# Patient Record
Sex: FEMALE | Race: WHITE | NOT HISPANIC OR LATINO | Employment: FULL TIME | URBAN - METROPOLITAN AREA
[De-identification: names, ages, dates, MRNs, and addresses within clinical notes are randomized per-mention and may not be internally consistent; named-entity substitution may affect disease eponyms.]

---

## 2018-04-02 ENCOUNTER — APPOINTMENT (EMERGENCY)
Dept: RADIOLOGY | Facility: HOSPITAL | Age: 38
End: 2018-04-02
Payer: COMMERCIAL

## 2018-04-02 ENCOUNTER — HOSPITAL ENCOUNTER (EMERGENCY)
Facility: HOSPITAL | Age: 38
Discharge: HOME | End: 2018-04-02
Attending: EMERGENCY MEDICINE | Admitting: EMERGENCY MEDICINE
Payer: COMMERCIAL

## 2018-04-02 VITALS
OXYGEN SATURATION: 98 % | RESPIRATION RATE: 20 BRPM | HEART RATE: 80 BPM | HEIGHT: 67 IN | DIASTOLIC BLOOD PRESSURE: 79 MMHG | BODY MASS INDEX: 37.67 KG/M2 | SYSTOLIC BLOOD PRESSURE: 132 MMHG | TEMPERATURE: 97 F | WEIGHT: 240 LBS

## 2018-04-02 DIAGNOSIS — M79.604 MUSCULOSKELETAL PAIN OF RIGHT LOWER EXTREMITY: Primary | ICD-10-CM

## 2018-04-02 PROCEDURE — 73552 X-RAY EXAM OF FEMUR 2/>: CPT

## 2018-04-02 PROCEDURE — 96372 THER/PROPH/DIAG INJ SC/IM: CPT

## 2018-04-02 PROCEDURE — 99283 EMERGENCY DEPT VISIT LOW MDM: CPT

## 2018-04-02 PROCEDURE — 72170 X-RAY EXAM OF PELVIS: CPT

## 2018-04-02 RX ORDER — KETOROLAC TROMETHAMINE 30 MG/ML
15 INJECTION, SOLUTION INTRAMUSCULAR; INTRAVENOUS ONCE
Status: COMPLETED | OUTPATIENT
Start: 2018-04-02 | End: 2018-04-02

## 2018-04-02 RX ADMIN — KETOROLAC TROMETHAMINE 15 MG: 30 INJECTION, SOLUTION INTRAMUSCULAR; INTRAVENOUS at 19:23

## 2018-04-03 NOTE — ED PROVIDER NOTES
History  Chief Complaint   Patient presents with    Hip Pain     To ED with c/o right hip pain for 2 months  Patient denies any injury, falls,  States that she is unable to pick her leg above 3 inches off the ground  History provided by:  Patient   used: No      Pt is a 43 y/o female presentign to ED with right hip pain  2 months but gettign worse, worse with ambulation  No falls or trauma  No f/c  No n/v  No abd pain  Did not lose bowel or bladder control  No numbness or tingling  Able to ambulate     mdm xray, nsaid for pain  Pt does not want crutches  To follow up with ortho        None       History reviewed  No pertinent past medical history  History reviewed  No pertinent surgical history  History reviewed  No pertinent family history  I have reviewed and agree with the history as documented  Social History   Substance Use Topics    Smoking status: Current Every Day Smoker    Smokeless tobacco: Never Used    Alcohol use Yes      Comment: social        Review of Systems   Constitutional: Negative for chills, diaphoresis and fever  HENT: Negative for congestion and sore throat  Respiratory: Negative for cough, shortness of breath, wheezing and stridor  Cardiovascular: Negative for chest pain, palpitations and leg swelling  Gastrointestinal: Negative for abdominal pain, blood in stool, diarrhea, nausea and vomiting  Genitourinary: Negative for dysuria, frequency and urgency  Musculoskeletal: Negative for back pain, neck pain and neck stiffness  Skin: Negative for pallor and rash  Neurological: Negative for dizziness, syncope, weakness, light-headedness and headaches  All other systems reviewed and are negative        Physical Exam  ED Triage Vitals [04/02/18 1849]   Temperature Pulse Respirations Blood Pressure SpO2   (!) 97 °F (36 1 °C) 80 20 132/79 98 %      Temp Source Heart Rate Source Patient Position - Orthostatic VS BP Location FiO2 (%) Tympanic Monitor Sitting Right arm --      Pain Score       7           Orthostatic Vital Signs  Vitals:    04/02/18 1849   BP: 132/79   Pulse: 80   Patient Position - Orthostatic VS: Sitting       Physical Exam   Constitutional: She is oriented to person, place, and time  She appears well-developed and well-nourished  HENT:   Head: Normocephalic and atraumatic  Eyes: Pupils are equal, round, and reactive to light  Neck: Neck supple  Cardiovascular: Normal rate, regular rhythm, normal heart sounds and intact distal pulses  Pulmonary/Chest: Effort normal and breath sounds normal  No respiratory distress  Abdominal: Soft  Bowel sounds are normal  There is no tenderness  Musculoskeletal: She exhibits no edema or deformity  Decreased ROM of right hip mehreen to pain, neurovascaulrly intact distally, no bruising, no swelling, no skin changes  No c/t/l spine tenderness   Neurological: She is alert and oriented to person, place, and time  Skin: Skin is warm and dry  Capillary refill takes less than 2 seconds  No rash noted  No erythema  Vitals reviewed  ED Medications  Medications   ketorolac (TORADOL) injection 15 mg (15 mg Intramuscular Given 4/2/18 1923)       Diagnostic Studies  Results Reviewed     None                 XR pelvis ap only 1 or 2 vw   Final Result by Leo Monroe MD (04/02 2135)      No acute fracture    Mild lumbar spondylosis  Workstation performed: [id number]         XR femur 2 views RIGHT   Final Result by Leo Monroe MD (04/02 2133)      No acute osseous abnormality              Workstation performed: [id number]                    Procedures  Procedures       Phone Contacts  ED Phone Contact    ED Course  ED Course                                MDM  CritCare Time    Disposition  Final diagnoses:   Musculoskeletal pain of right lower extremity     Time reflects when diagnosis was documented in both MDM as applicable and the Disposition within this note Time User Action Codes Description Comment    4/2/2018  8:01 PM Tim Fix Add [Y20 330] Musculoskeletal pain of right lower extremity       ED Disposition     ED Disposition Condition Comment    Discharge  Nasrin Living discharge to home/self care  Condition at discharge: Good        Follow-up Information     Follow up With Specialties Details Why Contact Info Additional Information    [address] [facility] Emergency Medicine  As needed, If symptoms worsen [address] [facility]  [address] [facility] [address] [place] [address], [address], [facility], [address] [facility], [address] [facility] Orthopedic Surgery Call in 1 day  make an appointment for hip pain [address] [facility]  [address] [phone]         There are no discharge medications for this patient  No discharge procedures on file      ED Provider  Electronically Signed by           Socorro Hayward MD  04/03/18 1552

## 2018-04-03 NOTE — DISCHARGE INSTRUCTIONS
Please use ibuprofen and Tylenol for pain as needed   Every 6 hours  Leg Cramps   WHAT YOU NEED TO KNOW:   A leg cramp is a sudden, painful squeeze in your calf (lower leg) or thigh (upper leg) muscles  The muscle may twitch under the skin or feel hard  Your leg may feel sore long after the muscles relax  DISCHARGE INSTRUCTIONS:   To stretch your leg:  Warm up your muscles before you stretch  Take a short walk or run slowly in place  If you get leg cramps while you sleep, you may also want to stretch before bedtime  The following exercises stretch the calves and thighs to help stop or prevent leg cramps:  · To stretch your calf and heel:      ¨ Stand up and place the palms of your hands against a wall  ¨ Lean into the wall, with one leg behind the other  Bend the front leg and keep the back leg straight  ¨ Press the heel of your back leg into the floor  ¨ Hold for 15 to 30 seconds, then switch sides  · To stretch the back of your knee and thigh:      ¨ Sit on the floor with your legs straight in front of you  Do not point your toes  ¨ Place the palms of your hands at your sides on the floor  Slide your hands past your hips toward your ankles  ¨ Move toward your ankles until you feel a stretch in the back of your legs  Do not try to touch your head to your knees or round your back  ¨ Hold for 30 seconds  Drink plenty of liquids during exercise:  Water and other liquids help prevent cramps by replacing fluids lost in sweat  Drink more liquids when you are active in hot weather  To stop a leg cramp if it happens again:   · Stretch and massage your muscle to stop the cramp  · Apply heat or ice packs to the cramp  A heating pad or hot pack may help relieve tight muscles  An ice pack or crushed ice in a bag, wrapped in a towel can soothe tender or sore muscles    Take your medicine as directed:  Call your healthcare provider if you think your medicine is not helping or if you have side effects  Tell him if you are taking any vitamins, herbs, or other medicines  Keep a list of the medicines you take  Include the amounts, and when and why you take them  Bring the list or the pill bottles to follow-up visits  Follow up with your healthcare provider as directed:  Write down any questions you have so you remember to ask them in your follow-up visits  Contact your healthcare provider if:   · Your leg cramps get worse or happen more often  · Your leg feels numb  · Your leg cramps do not go away with stretching or massage  · You feel dizzy, lightheaded, or confused when you exercise in hot weather  You may have a headache or blurred vision  © 2017 [facility] Information is for End User's use only and may not be sold, redistributed or otherwise used for commercial purposes  All illustrations and images included in CareNotes® are the copyrighted property of [employer]  or Darius Moran  The above information is an  only  It is not intended as medical advice for individual conditions or treatments  Talk to your doctor, nurse or pharmacist before following any medical regimen to see if it is safe and effective for you

## 2019-04-10 ENCOUNTER — OFFICE VISIT (OUTPATIENT)
Dept: URGENT CARE | Facility: CLINIC | Age: 39
End: 2019-04-10
Payer: COMMERCIAL

## 2019-04-10 VITALS
HEART RATE: 92 BPM | BODY MASS INDEX: 39.16 KG/M2 | SYSTOLIC BLOOD PRESSURE: 120 MMHG | DIASTOLIC BLOOD PRESSURE: 78 MMHG | WEIGHT: 250 LBS | OXYGEN SATURATION: 99 %

## 2019-04-10 DIAGNOSIS — H10.31 ACUTE BACTERIAL CONJUNCTIVITIS OF RIGHT EYE: Primary | ICD-10-CM

## 2019-04-10 PROCEDURE — 99203 OFFICE O/P NEW LOW 30 MIN: CPT | Performed by: PHYSICIAN ASSISTANT

## 2019-04-10 RX ORDER — POLYMYXIN B SULFATE AND TRIMETHOPRIM 1; 10000 MG/ML; [USP'U]/ML
1 SOLUTION OPHTHALMIC EVERY 4 HOURS
Qty: 10 ML | Refills: 0 | Status: SHIPPED | OUTPATIENT
Start: 2019-04-10 | End: 2019-04-17

## 2019-07-28 ENCOUNTER — HOSPITAL ENCOUNTER (EMERGENCY)
Facility: HOSPITAL | Age: 39
Discharge: HOME | End: 2019-07-28
Attending: EMERGENCY MEDICINE
Payer: COMMERCIAL

## 2019-07-28 VITALS
SYSTOLIC BLOOD PRESSURE: 110 MMHG | RESPIRATION RATE: 15 BRPM | HEART RATE: 80 BPM | WEIGHT: 255 LBS | DIASTOLIC BLOOD PRESSURE: 62 MMHG | TEMPERATURE: 97.3 F | BODY MASS INDEX: 39.94 KG/M2 | OXYGEN SATURATION: 97 %

## 2019-07-28 DIAGNOSIS — H72.92 TYMPANIC MEMBRANE PERFORATION, LEFT: ICD-10-CM

## 2019-07-28 DIAGNOSIS — H60.92 LEFT OTITIS EXTERNA: Primary | ICD-10-CM

## 2019-07-28 PROCEDURE — 99283 EMERGENCY DEPT VISIT LOW MDM: CPT | Performed by: EMERGENCY MEDICINE

## 2019-07-28 PROCEDURE — 99282 EMERGENCY DEPT VISIT SF MDM: CPT

## 2019-07-28 RX ORDER — AMOXICILLIN 250 MG/1
500 CAPSULE ORAL ONCE
Status: COMPLETED | OUTPATIENT
Start: 2019-07-28 | End: 2019-07-28

## 2019-07-28 RX ORDER — HYDROCODONE BITARTRATE AND ACETAMINOPHEN 5; 325 MG/1; MG/1
1 TABLET ORAL EVERY 6 HOURS PRN
Qty: 12 TABLET | Refills: 0 | Status: SHIPPED | OUTPATIENT
Start: 2019-07-28

## 2019-07-28 RX ORDER — AMOXICILLIN 875 MG/1
875 TABLET, COATED ORAL 2 TIMES DAILY
Qty: 19 TABLET | Refills: 0 | Status: SHIPPED | OUTPATIENT
Start: 2019-07-28 | End: 2019-08-07

## 2019-07-28 RX ADMIN — AMOXICILLIN 500 MG: 250 CAPSULE ORAL at 22:51

## 2019-07-29 NOTE — ED PROVIDER NOTES
History  Chief Complaint   Patient presents with    Earache     Pt states that she started with ear pain on Friday  Pain 8/10  Sharp shooting pains  Denies drainage  C/o headache      This 66-year-old female complains of left ear pain for the past 3 or 4 days  It is unrelieved with ibuprofen and Tylenol  She has a mild headache  She denies pain in her neck, throat and nose  No change in vision, hearing or breathing  Denies vertigo, diplopia, neck pain, focal neurologic changes  States she has a remote tympanic membrane rupture on the left side for which she received no treatment  None       History reviewed  No pertinent past medical history  Past Surgical History:   Procedure Laterality Date    APPENDECTOMY       SECTION  ,     HERNIA REPAIR      TUBAL LIGATION         History reviewed  No pertinent family history  I have reviewed and agree with the history as documented  Social History     Tobacco Use    Smoking status: Current Every Day Smoker    Smokeless tobacco: Never Used   Substance Use Topics    Alcohol use: Yes     Comment: social    Drug use: No        Review of Systems   Constitutional: Negative  HENT: Positive for ear pain  Negative for ear discharge, facial swelling, hearing loss, postnasal drip, rhinorrhea, sinus pain, sore throat, tinnitus and trouble swallowing  Eyes: Negative  Respiratory: Negative  Cardiovascular: Negative  Gastrointestinal: Negative  Endocrine: Negative  Genitourinary: Negative  Musculoskeletal: Negative  Skin: Negative  Allergic/Immunologic: Negative  Neurological: Negative  Hematological: Negative  Psychiatric/Behavioral: Negative  All other systems reviewed and are negative  Physical Exam  Physical Exam   Constitutional: She is oriented to person, place, and time  She appears well-developed and well-nourished  HENT:   Head: Normocephalic and atraumatic     Nose: Nose normal  Mouth/Throat: Oropharynx is clear and moist    Right TM mildly injected  No fluid or deformity  Left TM is injected and has a perforation  There appears to be blood within the TM  There is no drainage from the TM  There appears to be small abscess or collection of blood in the external auditory canal   No swelling of the canal   No pain with movement of the pinna  Eyes: Pupils are equal, round, and reactive to light  Conjunctivae and EOM are normal    Neck: Normal range of motion  Neck supple  No JVD present  Cardiovascular: Normal rate and regular rhythm  No murmur heard  Pulmonary/Chest: Effort normal and breath sounds normal    Abdominal: Soft  Bowel sounds are normal  There is no tenderness  Musculoskeletal: Normal range of motion  She exhibits no edema  Lymphadenopathy:     She has no cervical adenopathy  Neurological: She is alert and oriented to person, place, and time  She has normal reflexes  No cranial nerve deficit  Coordination normal    Skin: Skin is warm and dry  Capillary refill takes less than 2 seconds  No rash noted  Psychiatric: She has a normal mood and affect  Nursing note and vitals reviewed        Vital Signs  ED Triage Vitals   Temperature Pulse Respirations Blood Pressure SpO2   07/28/19 2232 07/28/19 2232 07/28/19 2232 07/28/19 2232 07/28/19 2232   (!) 97 3 °F (36 3 °C) 88 18 128/65 100 %      Temp Source Heart Rate Source Patient Position - Orthostatic VS BP Location FiO2 (%)   07/28/19 2232 07/28/19 2245 07/28/19 2232 07/28/19 2232 --   Temporal Monitor Lying Left arm       Pain Score       --                  Vitals:    07/28/19 2232 07/28/19 2245   BP: 128/65 110/62   Pulse: 88 80   Patient Position - Orthostatic VS: Lying Lying         Visual Acuity      ED Medications  Medications   amoxicillin (AMOXIL) capsule 500 mg (500 mg Oral Given 7/28/19 2251)       Diagnostic Studies  Results Reviewed     None                 No orders to display Procedures  Procedures       ED Course                               [facility]  Number of Diagnoses or Management Options  Left otitis externa: new and does not require workup  Tympanic membrane perforation, left:   Diagnosis management comments: Patient with history of prior TM perforation  Will treat with oral antibiotic get a order for ENT to see the auditory canal and TM clearly in close follow-up  Disposition  Final diagnoses:   Left otitis externa   Tympanic membrane perforation, left     Time reflects when diagnosis was documented in both MDM as applicable and the Disposition within this note     Time User Action Codes Description Comment    7/28/2019 10:45 PM Marley Cueva Add [H60 92] Left otitis externa     7/28/2019 10:45 PM Marley Cueva Add [H72 92] Tympanic membrane perforation, left       ED Disposition     ED Disposition Condition Date/Time Comment    Discharge Stable Sun Jul 28, 2019 10:45 PM Bebeto Bean discharge to home/self care  Follow-up Information     Follow up With Specialties Details Why Pau Michael MD Otolaryngology Call in 1 day Follow-up on this infection and perforated eardrum [address] [facility]  [address] [facility]            Discharge Medication List as of 7/28/2019 10:51 PM      START taking these medications    Details   amoxicillin (AMOXIL) 875 mg tablet Take 1 tablet (875 mg total) by mouth 2 (two) times a day for 10 days, Starting Sun 7/28/2019, Until Wed 8/7/2019, Normal      HYDROcodone-acetaminophen (NORCO) 5-325 mg per tablet Take 1 tablet by mouth every 6 (six) hours as needed for pain for up to 12 dosesMax Daily Amount: 4 tablets, Starting Sun 7/28/2019, Normal           No discharge procedures on file      ED Provider  Electronically Signed by           Rochelle Pope DO  07/31/19 3767

## 2019-07-29 NOTE — DISCHARGE INSTRUCTIONS
Instead of ear drops for otitis externa I want you to take oral antibiotics so the doctor can have a good view of the canal and the ear drum  Take ibuprofen and Tylenol as needed for pain  Reserve narcotic for severe pain  Take antibiotic as directed

## 2020-01-22 ENCOUNTER — OFFICE VISIT (OUTPATIENT)
Dept: URGENT CARE | Facility: CLINIC | Age: 40
End: 2020-01-22
Payer: COMMERCIAL

## 2020-01-22 ENCOUNTER — APPOINTMENT (OUTPATIENT)
Dept: RADIOLOGY | Facility: CLINIC | Age: 40
End: 2020-01-22
Payer: COMMERCIAL

## 2020-01-22 VITALS
SYSTOLIC BLOOD PRESSURE: 134 MMHG | TEMPERATURE: 98 F | OXYGEN SATURATION: 98 % | WEIGHT: 250 LBS | BODY MASS INDEX: 39.24 KG/M2 | RESPIRATION RATE: 21 BRPM | DIASTOLIC BLOOD PRESSURE: 73 MMHG | HEART RATE: 97 BPM | HEIGHT: 67 IN

## 2020-01-22 DIAGNOSIS — G89.29 CHRONIC HEEL PAIN, RIGHT: ICD-10-CM

## 2020-01-22 DIAGNOSIS — M79.671 CHRONIC HEEL PAIN, RIGHT: Primary | ICD-10-CM

## 2020-01-22 DIAGNOSIS — G89.29 CHRONIC HEEL PAIN, RIGHT: Primary | ICD-10-CM

## 2020-01-22 DIAGNOSIS — M72.2 PLANTAR FASCIITIS OF RIGHT FOOT: ICD-10-CM

## 2020-01-22 DIAGNOSIS — M79.671 CHRONIC HEEL PAIN, RIGHT: ICD-10-CM

## 2020-01-22 PROCEDURE — 99213 OFFICE O/P EST LOW 20 MIN: CPT | Performed by: NURSE PRACTITIONER

## 2020-01-22 PROCEDURE — 73650 X-RAY EXAM OF HEEL: CPT

## 2020-01-22 RX ORDER — PREDNISONE 20 MG/1
TABLET ORAL
Qty: 18 TABLET | Refills: 0 | Status: SHIPPED | OUTPATIENT
Start: 2020-01-22

## 2020-01-22 NOTE — PROGRESS NOTES
[facility]        NAME: Negrito Dickens is a 37 y o  female  : 1980    MRN: [id number]  DATE: 2020  TIME: 2:29 PM    Assessment and Plan   Chronic heel pain, right [M79 671, G64 29]  1  Chronic heel pain, right  XR heel / calcaneus 2+ vw right   2  Plantar fasciitis of right foot  predniSONE 20 mg tablet         Patient Instructions     Establish with PCP; may need referral to podiatrist or orthopedic  Take meds as directed  Rest, ice , heat  Proceed to  ER if symptoms worsen  Chief Complaint     Chief Complaint   Patient presents with    Foot Pain     Right foot, heel pain  Sharp shooting pain from the heel to the back of the leg; Symptoms began approximatrly a year ago and became more of an issue when she couldn't bare weight a week ago  Pt states she does not have a PCP  History of Present Illness       HPI   Reports pain to the right heel x 1 year  Has gotten worse over the past 2 weeks  Was previously having symptoms every day, mild x 1 year  Shooting pain  Today pain is 9/10  Constant  Radiates up the back of the heel to the back of the calf  Worse with weight bearing  Tried ibuprofen, warm soaks, and stretching  Not helping much  Review of Systems   Review of Systems   Musculoskeletal: Positive for gait problem and myalgias (right heel)  Negative for joint swelling  Skin: Negative for wound  Neurological: Negative for weakness and numbness           Current Medications       Current Outpatient Medications:     HYDROcodone-acetaminophen (NORCO) 5-325 mg per tablet, Take 1 tablet by mouth every 6 (six) hours as needed for pain for up to 12 dosesMax Daily Amount: 4 tablets (Patient not taking: Reported on 2020), Disp: 12 tablet, Rfl: 0    predniSONE 20 mg tablet, 3 tabs/day x 3 days, then BID x 3 days, then daily x 3 days, Disp: 18 tablet, Rfl: 0    Current Allergies     Allergies as of 2020    (No Known Allergies)            The following portions of the patient's history were reviewed and updated as appropriate: allergies, current medications, past family history, past medical history, past social history, past surgical history and problem list      History reviewed  No pertinent past medical history  Past Surgical History:   Procedure Laterality Date    APPENDECTOMY       SECTION  ,     HERNIA REPAIR      TUBAL LIGATION         History reviewed  No pertinent family history  Medications have been verified  Objective   /73   Pulse 97   Temp 98 °F (36 7 °C) (Tympanic)   Resp 21   Ht 5' 7" (1 702 m)   Wt 113 kg (250 lb)   SpO2 98%   BMI 39 16 kg/m²        Physical Exam     Physical Exam   Musculoskeletal: Normal range of motion  She exhibits edema (of the heel, mild) and tenderness  Skin: No rash noted  No erythema     No skin break

## 2020-01-22 NOTE — PATIENT INSTRUCTIONS
Plantar Fasciitis   WHAT YOU NEED TO KNOW:   Rest, ice, and foot supports can help your plantar fascia heal  You may need medicines to decrease swelling and pain  A physical therapist can teach you exercises to help improve movement and strength, and to decrease pain  DISCHARGE INSTRUCTIONS:   Contact your healthcare provider or podiatrist if:   · Your pain and swelling increase  · You develop knee, hip, or back pain  · You have questions or concerns about your condition or care  Medicines: You may  need any of the following:  · Acetaminophen  decreases pain and fever  It is available without a doctor's order  Ask how much to give your child and how often to give it  Follow directions  Read the labels of all other medicines your child uses to see if they also contain acetaminophen, or ask your child's doctor or pharmacist  Acetaminophen can cause liver damage if not taken correctly  · NSAIDs , such as ibuprofen, help decrease swelling, pain, and fever  NSAIDs can cause stomach bleeding or kidney problems in certain people  If you take blood thinner medicine, always ask your healthcare provider if NSAIDs are safe for you  Always read the medicine label and follow directions  · Take your medicine as directed  Contact your healthcare provider if you think your medicine is not helping or if you have side effects  Tell him of her if you are allergic to any medicine  Keep a list of the medicines, vitamins, and herbs you take  Include the amounts, and when and why you take them  Bring the list or the pill bottles to follow-up visits  Carry your medicine list with you in case of an emergency  Self-care:   · Wear your splint or shoe inserts as directed  You may need to wear a splint at night to keep your foot stretched while you sleep  This will help prevent sharp pain first thing in the morning  Shoe inserts will help decrease stress on your plantar fascia when you walk or exercise       · Rest as directed  Rest as much as possible to decrease swelling and prevent more damage  Ask your healthcare provider when you can return to your normal activities  · Apply ice on your plantar fascia  Ice helps prevent tissue damage and decreases swelling and pain  Fill a water bottle with water and freeze it  Wrap a towel around the bottle or cover it with a pillow case  Roll the water bottle under your foot for 10 minutes in the morning and after work  · Massage your plantar fascia as directed  This may help decrease swelling and pain  Roll a golf ball under your foot for 10 minutes  Repeat 3 times each day  · Go to physical therapy as directed  A physical therapist teaches you exercises to help improve movement and strength, and to decrease pain  Prevent plantar fasciitis:   · Maintain a healthy weight  This will help decrease stress on your feet  Ask your healthcare provider how much you should weigh  Ask him to help you create a weight loss plan if you are overweight  · Do low-impact exercises  Low-impact exercises decrease stress on your plantar fascia  Examples include swimming or bicycling  · Start new activities slowly  Increase the intensity and time gradually  · Wear shoes that fit well and support your arch  Replace your shoes before the padding or shock absorption wears out  Do not walk or  bare feet or sandals for long periods of time  · Stretch before you exercise  Ask your healthcare provider how to stretch your plantar fascia and calf muscles  Follow up with your healthcare provider or podiatrist as directed:  Write down your questions so you remember to ask them during your visits  © 2017 [facility] Information is for End User's use only and may not be sold, redistributed or otherwise used for commercial purposes  All illustrations and images included in CareNotes® are the copyrighted property of [employer]  or [facility]    The above information is an  only  It is not intended as medical advice for individual conditions or treatments  Talk to your doctor, nurse or pharmacist before following any medical regimen to see if it is safe and effective for you

## 2020-01-27 ENCOUNTER — OFFICE VISIT (OUTPATIENT)
Dept: URGENT CARE | Facility: CLINIC | Age: 40
End: 2020-01-27
Payer: COMMERCIAL

## 2020-01-27 VITALS
SYSTOLIC BLOOD PRESSURE: 135 MMHG | BODY MASS INDEX: 39.24 KG/M2 | RESPIRATION RATE: 18 BRPM | OXYGEN SATURATION: 100 % | HEIGHT: 67 IN | WEIGHT: 250 LBS | HEART RATE: 81 BPM | DIASTOLIC BLOOD PRESSURE: 71 MMHG | TEMPERATURE: 98 F

## 2020-01-27 DIAGNOSIS — J02.9 SORE THROAT: Primary | ICD-10-CM

## 2020-01-27 LAB — S PYO AG THROAT QL: NEGATIVE

## 2020-01-27 PROCEDURE — 99213 OFFICE O/P EST LOW 20 MIN: CPT | Performed by: NURSE PRACTITIONER

## 2020-01-27 PROCEDURE — 87880 STREP A ASSAY W/OPTIC: CPT | Performed by: NURSE PRACTITIONER

## 2020-01-27 PROCEDURE — 87070 CULTURE OTHR SPECIMN AEROBIC: CPT | Performed by: NURSE PRACTITIONER

## 2020-01-27 NOTE — PROGRESS NOTES
[facility]        NAME: Felix Siu is a 37 y o  female  : 1980    MRN: [id number]  DATE: 2020  TIME: 6:42 PM    Assessment and Plan   Sore throat [J02 9]  1  Sore throat  POCT rapid strepA    Throat culture         Patient Instructions     Advised to continue hydration with water  Throat lozenges OTC  Rapid strep was negative; will send for culture  Will follow-up with PCP for back pain  Follow up with PCP in 3-5 days  Proceed to  ER if symptoms worsen  Chief Complaint     Chief Complaint   Patient presents with    Sore Throat     Began this morning   Back Pain         History of Present Illness       HPI   Patient presents to clinic with complaint of sore throat that started this morning  States her mouth feels dry and has been dry or through the day  That she has been drinking water, so the on the morning a without any improvement in the dryness  No other symptoms  Denies history of diabetes  Does not have a PCP  States a PCP checked her blood sugar several years ago and was normal   Refused for us to check the blood sugar, today  Review of Systems   Review of Systems   Constitutional: Negative for chills and fever  HENT: Positive for sore throat  Negative for congestion and trouble swallowing  Respiratory: Negative for cough, chest tightness and wheezing  Cardiovascular: Negative for chest pain  Gastrointestinal: Negative for diarrhea, nausea and vomiting  Neurological: Negative for dizziness and headaches           Current Medications       Current Outpatient Medications:     predniSONE 20 mg tablet, 3 tabs/day x 3 days, then BID x 3 days, then daily x 3 days, Disp: 18 tablet, Rfl: 0    HYDROcodone-acetaminophen (NORCO) 5-325 mg per tablet, Take 1 tablet by mouth every 6 (six) hours as needed for pain for up to 12 dosesMax Daily Amount: 4 tablets (Patient not taking: Reported on 2020), Disp: 12 tablet, Rfl: 0    Current Allergies Allergies as of 2020    (No Known Allergies)            The following portions of the patient's history were reviewed and updated as appropriate: allergies, current medications, past family history, past medical history, past social history, past surgical history and problem list      History reviewed  No pertinent past medical history  Past Surgical History:   Procedure Laterality Date    APPENDECTOMY       SECTION  ,     HERNIA REPAIR      TUBAL LIGATION         History reviewed  No pertinent family history  Medications have been verified  Objective   /71   Pulse 81   Temp 98 °F (36 7 °C) (Tympanic)   Resp 18   Ht 5' 7" (1 702 m)   Wt 113 kg (250 lb)   SpO2 100%   BMI 39 16 kg/m²        Physical Exam     Physical Exam   Constitutional: She appears well-developed  She does not appear ill  HENT:   Right Ear: Tympanic membrane normal  No middle ear effusion  Left Ear: Tympanic membrane normal   No middle ear effusion  Mouth/Throat: No posterior oropharyngeal edema or posterior oropharyngeal erythema  Tonsils are 0 on the right  Tonsils are 0 on the left  No tonsillar exudate  Cardiovascular: Normal rate and regular rhythm  Pulmonary/Chest: Effort normal and breath sounds normal    Lymphadenopathy:     She has no cervical adenopathy

## 2020-01-27 NOTE — PATIENT INSTRUCTIONS
Sore Throat, Ambulatory Care   GENERAL INFORMATION:   A sore throat  is often caused by a cold or flu virus  A sore throat may also be caused by bacteria such as strep  Other causes include smoking, a runny nose, allergies, or acid reflux  Seek immediate care for the following symptoms:   · Trouble breathing or swallowing because your throat is swollen or sore    · Drooling because it hurts too much to swallow    · A painful lump in your throat that does not go away after 5 days    · A fever higher than 102? F (39?C) or lasts longer than 3 days    · Confusion    · Blood in your throat or ear  Treatment for a sore throat  will depend on the cause how severe it is  A sore throat cause by a virus will go away on its own without treatment  You will need antibiotics if your sore throat is caused by bacteria  Your sore throat should start to feel better within 3 to 5 days for both viral and bacterial infections  Care for your sore throat:   · Gargle with salt water  Mix ¼ teaspoon salt in a glass of warm water and gargle  This may help reduce swelling in your throat  · Take ibuprofen or acetaminophen:  These medicines decrease pain and fever  They are available without a doctor's order  Ask your healthcare provider which medicine is best for you  Ask how much to take and how often to take it  · Drink more liquids  Cold or warm drinks may help soothe your sore throat  Drinking liquids can also help prevent dehydration  · Use a cool-steam humidifier  to help moisten the air in your room and reduce your throat pain  · Use lozenges, ice, soft foods, or popsicles  to soothe your throat  · Rest your throat as much as possible  Try not to use your voice  This may irritate your throat and worsen your symptoms  Follow up with your healthcare provider as directed:  Write down your questions so you remember to ask them during your visits  CARE AGREEMENT:   You have the right to help plan your care   Learn about your health condition and how it may be treated  Discuss treatment options with your caregivers to decide what care you want to receive  You always have the right to refuse treatment  The above information is an  only  It is not intended as medical advice for individual conditions or treatments  Talk to your doctor, nurse or pharmacist before following any medical regimen to see if it is safe and effective for you  © 2014 [address] is for End User's use only and may not be sold, redistributed or otherwise used for commercial purposes  All illustrations and images included in CareNotes® are the copyrighted property of A D A M [employer]  or Darius Moran

## 2020-01-29 LAB — BACTERIA THROAT CULT: NORMAL

## 2020-11-04 ENCOUNTER — OFFICE VISIT (OUTPATIENT)
Dept: URGENT CARE | Facility: CLINIC | Age: 40
End: 2020-11-04
Payer: COMMERCIAL

## 2020-11-04 ENCOUNTER — APPOINTMENT (OUTPATIENT)
Dept: RADIOLOGY | Facility: CLINIC | Age: 40
End: 2020-11-04
Payer: COMMERCIAL

## 2020-11-04 VITALS
BODY MASS INDEX: 40.81 KG/M2 | SYSTOLIC BLOOD PRESSURE: 124 MMHG | DIASTOLIC BLOOD PRESSURE: 84 MMHG | TEMPERATURE: 98.3 F | OXYGEN SATURATION: 100 % | WEIGHT: 260 LBS | RESPIRATION RATE: 20 BRPM | HEIGHT: 67 IN | HEART RATE: 87 BPM

## 2020-11-04 DIAGNOSIS — S69.91XA FINGER INJURY, RIGHT, INITIAL ENCOUNTER: Primary | ICD-10-CM

## 2020-11-04 DIAGNOSIS — S69.91XA FINGER INJURY, RIGHT, INITIAL ENCOUNTER: ICD-10-CM

## 2020-11-04 PROCEDURE — 99213 OFFICE O/P EST LOW 20 MIN: CPT | Performed by: PHYSICIAN ASSISTANT

## 2020-11-04 PROCEDURE — 73140 X-RAY EXAM OF FINGER(S): CPT

## 2020-11-05 ENCOUNTER — OFFICE VISIT (OUTPATIENT)
Dept: OBGYN CLINIC | Facility: CLINIC | Age: 40
End: 2020-11-05
Payer: COMMERCIAL

## 2020-11-05 VITALS
WEIGHT: 256 LBS | SYSTOLIC BLOOD PRESSURE: 120 MMHG | HEIGHT: 67 IN | DIASTOLIC BLOOD PRESSURE: 80 MMHG | BODY MASS INDEX: 40.18 KG/M2 | TEMPERATURE: 99.4 F

## 2020-11-05 DIAGNOSIS — S63.432A: ICD-10-CM

## 2020-11-05 PROCEDURE — 99203 OFFICE O/P NEW LOW 30 MIN: CPT | Performed by: ORTHOPAEDIC SURGERY

## 2023-02-01 ENCOUNTER — APPOINTMENT (OUTPATIENT)
Dept: RADIOLOGY | Facility: CLINIC | Age: 43
End: 2023-02-01

## 2023-02-01 DIAGNOSIS — S90.31XD CONTUSION OF RIGHT FOOT, SUBSEQUENT ENCOUNTER: ICD-10-CM

## 2023-12-04 ENCOUNTER — OFFICE VISIT (OUTPATIENT)
Dept: URGENT CARE | Facility: CLINIC | Age: 43
End: 2023-12-04
Payer: COMMERCIAL

## 2023-12-04 VITALS
BODY MASS INDEX: 39.28 KG/M2 | SYSTOLIC BLOOD PRESSURE: 138 MMHG | TEMPERATURE: 98.3 F | RESPIRATION RATE: 18 BRPM | OXYGEN SATURATION: 98 % | HEART RATE: 93 BPM | DIASTOLIC BLOOD PRESSURE: 81 MMHG | WEIGHT: 250.8 LBS

## 2023-12-04 DIAGNOSIS — J20.9 ACUTE BRONCHITIS, UNSPECIFIED ORGANISM: Primary | ICD-10-CM

## 2023-12-04 DIAGNOSIS — R07.89 CHEST TIGHTNESS: ICD-10-CM

## 2023-12-04 PROCEDURE — G0383 LEV 4 HOSP TYPE B ED VISIT: HCPCS

## 2023-12-04 PROCEDURE — S9083 URGENT CARE CENTER GLOBAL: HCPCS

## 2023-12-04 RX ORDER — ALBUTEROL SULFATE 90 UG/1
2 AEROSOL, METERED RESPIRATORY (INHALATION) EVERY 6 HOURS PRN
Qty: 8.5 G | Refills: 0 | Status: SHIPPED | OUTPATIENT
Start: 2023-12-04

## 2023-12-04 RX ORDER — AZITHROMYCIN 250 MG/1
TABLET, FILM COATED ORAL
Qty: 6 TABLET | Refills: 0 | Status: SHIPPED | OUTPATIENT
Start: 2023-12-04 | End: 2023-12-08

## 2023-12-04 RX ORDER — IPRATROPIUM BROMIDE AND ALBUTEROL SULFATE 2.5; .5 MG/3ML; MG/3ML
3 SOLUTION RESPIRATORY (INHALATION) ONCE
Status: COMPLETED | OUTPATIENT
Start: 2023-12-04 | End: 2023-12-04

## 2023-12-04 RX ORDER — METHYLPREDNISOLONE 4 MG/1
TABLET ORAL
Qty: 1 EACH | Refills: 0 | Status: SHIPPED | OUTPATIENT
Start: 2023-12-04

## 2023-12-04 RX ADMIN — IPRATROPIUM BROMIDE AND ALBUTEROL SULFATE 3 ML: 2.5; .5 SOLUTION RESPIRATORY (INHALATION) at 13:58

## 2023-12-04 NOTE — PROGRESS NOTES
[facility]        NAME: Edgardo Stanley is a 38 y.o. female  : 1980    MRN: [id number]  DATE: 2023  TIME: 3:15 PM    Assessment and Plan   Acute bronchitis, unspecified organism [J20.9]  1. Acute bronchitis, unspecified organism  azithromycin (ZITHROMAX) 250 mg tablet    methylPREDNISolone 4 MG tablet therapy pack    albuterol (ProAir HFA) 90 mcg/act inhaler      2. Chest tightness  ipratropium-albuterol (DUO-NEB) 0.5-2.5 mg/3 mL inhalation solution 3 mL          Patient Instructions       Follow up with PCP in 3-5 days. Proceed to  ER if symptoms worsen. Chief Complaint     Chief Complaint   Patient presents with    Cough    Shortness of Breath     Patient reports cough started a few days after thanksgiving and shortness of breath started yesterday. Patient states that she feels heavy in the chest, the cough she has is productive. History of Present Illness       37 y/o F presents for cough and sinus complaints x "a few days after thanksgiving." No known sick contacts. Dayquil/Nyquil PRN. Yesterday chest heaviness developed. Feels SOB with exertion. No chest pain, numbness, tingling, palpations. 1 PPD smoker x 30 years. Review of Systems   Review of Systems   Constitutional:  Negative for chills and fever. HENT:  Positive for congestion, ear pain and rhinorrhea. Negative for sore throat. Respiratory:  Positive for cough, chest tightness and shortness of breath. Cardiovascular:  Negative for chest pain and palpitations.          Current Medications       Current Outpatient Medications:     albuterol (ProAir HFA) 90 mcg/act inhaler, Inhale 2 puffs every 6 (six) hours as needed for wheezing, Disp: 8.5 g, Rfl: 0    azithromycin (ZITHROMAX) 250 mg tablet, Take 2 tablets today then 1 tablet daily x 4 days, Disp: 6 tablet, Rfl: 0    methylPREDNISolone 4 MG tablet therapy pack, Use as directed on package, Disp: 1 each, Rfl: 0    HYDROcodone-acetaminophen ([address]) 5-325 mg per tablet, Take 1 tablet by mouth every 6 (six) hours as needed for pain for up to 12 dosesMax Daily Amount: 4 tablets (Patient not taking: Reported on 2020), Disp: 12 tablet, Rfl: 0    predniSONE 20 mg tablet, 3 tabs/day x 3 days, then BID x 3 days, then daily x 3 days (Patient not taking: Reported on 2020), Disp: 18 tablet, Rfl: 0  No current facility-administered medications for this visit. Current Allergies     Allergies as of 2023    (No Known Allergies)            The following portions of the patient's history were reviewed and updated as appropriate: allergies, current medications, past family history, past medical history, past social history, past surgical history and problem list.     No past medical history on file. Past Surgical History:   Procedure Laterality Date    APPENDECTOMY       SECTION  ,     HERNIA REPAIR      TUBAL LIGATION         No family history on file. Medications have been verified. Objective   /81   Pulse 93   Temp 98.3 °F (36.8 °C) (Tympanic)   Resp 18   Wt 114 kg (250 lb 12.8 oz)   SpO2 98%   BMI 39.28 kg/m²   No LMP recorded. Physical Exam     Physical Exam  Vitals and nursing note reviewed. Constitutional:       General: She is not in acute distress. Appearance: She is not toxic-appearing. HENT:      Head: Normocephalic and atraumatic. Right Ear: Tympanic membrane, ear canal and external ear normal.      Left Ear: Tympanic membrane, ear canal and external ear normal.      Nose: Nose normal.      Mouth/Throat:      Mouth: Mucous membranes are moist.      Pharynx: No oropharyngeal exudate or posterior oropharyngeal erythema. Eyes:      Conjunctiva/sclera: Conjunctivae normal.   Pulmonary:      Effort: Pulmonary effort is normal.      Breath sounds: Wheezing present. Lymphadenopathy:      Cervical: No cervical adenopathy. Neurological:      Mental Status: She is alert.    Psychiatric: Mood and Affect: Mood normal.         Behavior: Behavior normal.

## 2023-12-04 NOTE — LETTER
December 4, 2023     Patient: Matty Bryson   YOB: 1980   Date of Visit: 12/4/2023       To Whom it May Concern:    Matty Bryson was seen in my clinic on 12/4/2023. She may return to work on 12/5/23 . If you have any questions or concerns, please don't hesitate to call.          Sincerely,          Gloria Delgadillo PA-C        CC: No Recipients

## 2024-05-07 ENCOUNTER — OCCMED (OUTPATIENT)
Dept: URGENT CARE | Facility: CLINIC | Age: 44
End: 2024-05-07
Payer: OTHER MISCELLANEOUS

## 2024-05-07 ENCOUNTER — APPOINTMENT (OUTPATIENT)
Dept: RADIOLOGY | Facility: CLINIC | Age: 44
End: 2024-05-07
Payer: OTHER MISCELLANEOUS

## 2024-05-07 DIAGNOSIS — S60.222A CONTUSION OF LEFT HAND, INITIAL ENCOUNTER: Primary | ICD-10-CM

## 2024-05-07 DIAGNOSIS — S60.222A CONTUSION OF LEFT HAND, INITIAL ENCOUNTER: ICD-10-CM

## 2024-05-07 PROCEDURE — 99283 EMERGENCY DEPT VISIT LOW MDM: CPT | Performed by: PHYSICIAN ASSISTANT

## 2024-05-07 PROCEDURE — 73130 X-RAY EXAM OF HAND: CPT

## 2024-05-07 PROCEDURE — G0382 LEV 3 HOSP TYPE B ED VISIT: HCPCS | Performed by: PHYSICIAN ASSISTANT

## 2024-05-09 ENCOUNTER — VBI (OUTPATIENT)
Dept: ADMINISTRATIVE | Facility: OTHER | Age: 44
End: 2024-05-09

## 2024-07-03 ENCOUNTER — VBI (OUTPATIENT)
Dept: ADMINISTRATIVE | Facility: OTHER | Age: 44
End: 2024-07-03

## 2024-07-03 NOTE — TELEPHONE ENCOUNTER
07/03/24 9:52 AM     Chart reviewed for CRC: Colonoscopy ; nothing is submitted to the patient's insurance at this time.     CM GARCIA [facility]

## 2024-09-12 ENCOUNTER — VBI (OUTPATIENT)
Dept: ADMINISTRATIVE | Facility: OTHER | Age: 44
End: 2024-09-12

## 2024-09-12 NOTE — TELEPHONE ENCOUNTER
09/12/24 8:34 AM     Chart reviewed for Pap Smear (HPV) aka Cervical Cancer Screening ; nothing is submitted to the patient's insurance at this time.     CM GARCIA MA   [facility]

## 2024-09-26 ENCOUNTER — APPOINTMENT (OUTPATIENT)
Dept: RADIOLOGY | Facility: CLINIC | Age: 44
End: 2024-09-26
Payer: COMMERCIAL

## 2024-09-26 ENCOUNTER — OFFICE VISIT (OUTPATIENT)
Dept: OBGYN CLINIC | Facility: CLINIC | Age: 44
End: 2024-09-26

## 2024-09-26 ENCOUNTER — OFFICE VISIT (OUTPATIENT)
Dept: OCCUPATIONAL THERAPY | Facility: CLINIC | Age: 44
End: 2024-09-26
Payer: OTHER MISCELLANEOUS

## 2024-09-26 VITALS
BODY MASS INDEX: 41.12 KG/M2 | HEIGHT: 67 IN | SYSTOLIC BLOOD PRESSURE: 120 MMHG | DIASTOLIC BLOOD PRESSURE: 82 MMHG | WEIGHT: 262 LBS

## 2024-09-26 DIAGNOSIS — M25.521 PAIN IN RIGHT ELBOW: ICD-10-CM

## 2024-09-26 DIAGNOSIS — M77.11 LATERAL EPICONDYLITIS OF RIGHT ELBOW: ICD-10-CM

## 2024-09-26 DIAGNOSIS — M77.11 LATERAL EPICONDYLITIS OF RIGHT ELBOW: Primary | ICD-10-CM

## 2024-09-26 PROCEDURE — 99213 OFFICE O/P EST LOW 20 MIN: CPT | Performed by: ORTHOPAEDIC SURGERY

## 2024-09-26 PROCEDURE — L3906 WHO W/O JOINTS CF: HCPCS | Performed by: OCCUPATIONAL THERAPIST

## 2024-09-26 PROCEDURE — 73080 X-RAY EXAM OF ELBOW: CPT

## 2024-09-26 NOTE — PROGRESS NOTES
Assessment:     1. Lateral epicondylitis of right elbow    2. Pain in right elbow        Plan:     Problem List Items Addressed This Visit          Musculoskeletal and Integument    Lateral epicondylitis of right elbow - Primary     Findings consistent with right elbow lateral epicondylitis. X-ray and prognosis reviewed with patient. Treatment can take several months to a year for condition to resolve. She will continue with physical therapy, OT will make custom volar wrist splint to wear at night and during day if needed. Continue with tennis elbow strap as well. She can continue with ibuprofen as directed and apply topical voltaren gel or aspercreme. When lifting items keep forearms supinated. She was given work restrictions. See patient back in 4 weeks to re evaluate. All patient's questions were answered to her satisfaction.  This note is created using dictation transcription.  It may contain typographical errors, grammatical errors, improperly dictated words, background noise and other errors.         Relevant Orders    Ambulatory Referral to PT/OT Hand Therapy     Other Visit Diagnoses       Pain in right elbow        Relevant Orders    XR elbow 3+ vw right           Subjective:     Patient ID: Lillie Lopez is a 43 y.o. female.  Chief Complaint:  43 yr old female in for evaluation of right elbow pain. Work injury from February. She is  at [email or web address]. Her job requires repetitive lifting 20-25 lb parts. She states she was lifting part in February and felt a pop in elbow region followed with acute pain lateral aspect. Pain would radiate lateral to medial. She noted no swelling, bruising deformity of biceps. She did PT from February until May with complete resolution of pain. She returned to normal work duties. In September pain started to return in elbow and just as bad as original injury. Pain is diffuse around elbow and very sensitive to light touch. She continues to perform her normal duties with  "pain. End of day she can barely lift her arm. In morning it takes 5 minutes to stretch arm out before she can use it. She has returned to physical therapy past 4 weeks. She has tried tennis elbow strap, ibuprofen 800mg QID with no significant relief of pain. Denies any numbness or tingling in arm.     Allergy:  No Known Allergies  Medications:  all current active meds have been reviewed  Past Medical History:  History reviewed. No pertinent past medical history.  Past Surgical History:  Past Surgical History:   Procedure Laterality Date    APPENDECTOMY       SECTION  ,     HERNIA REPAIR      TUBAL LIGATION       Family History:  History reviewed. No pertinent family history.  Social History:  Social History     Substance and Sexual Activity   Alcohol Use Yes    Comment: social     Social History     Substance and Sexual Activity   Drug Use No     Social History     Tobacco Use   Smoking Status Every Day   Smokeless Tobacco Never     Review of Systems   Constitutional:  Negative for chills and fever.   HENT:  Negative for ear pain and sore throat.    Eyes:  Negative for pain and visual disturbance.   Respiratory:  Negative for cough and shortness of breath.    Cardiovascular:  Negative for chest pain and palpitations.   Gastrointestinal:  Negative for abdominal pain and vomiting.   Genitourinary:  Negative for dysuria and hematuria.   Musculoskeletal:  Positive for arthralgias (right elbow). Negative for back pain.   Skin:  Negative for color change and rash.   Neurological:  Negative for seizures and syncope.   All other systems reviewed and are negative.        Objective:  BP Readings from Last 1 Encounters:   24 120/82      Wt Readings from Last 1 Encounters:   24 119 kg (262 lb)      BMI:   Estimated body mass index is 41.04 kg/m² as calculated from the following:    Height as of this encounter: 5' 7\" (1.702 m).    Weight as of this encounter: 119 kg (262 lb).  BSA:   Estimated body " "surface area is 2.27 meters squared as calculated from the following:    Height as of this encounter: 5' 7\" (1.702 m).    Weight as of this encounter: 119 kg (262 lb).   Physical Exam  Constitutional:       Appearance: She is well-developed.   Eyes:      Pupils: Pupils are equal, round, and reactive to light.   Pulmonary:      Effort: Pulmonary effort is normal.      Breath sounds: Normal breath sounds.   Musculoskeletal:         General: Tenderness (right elbow arthralgia) present.   Skin:     General: Skin is warm and dry.   Neurological:      Mental Status: She is alert and oriented to person, place, and time.      Deep Tendon Reflexes: Reflexes are normal and symmetric.   Psychiatric:         Behavior: Behavior normal.         Thought Content: Thought content normal.         Judgment: Judgment normal.       Right Elbow Exam     Tenderness   The patient is experiencing tenderness in the lateral epicondyle and medial epicondyle.     Range of Motion   Extension:  normal   Flexion:  normal   Pronation:  normal Right elbow pronation: pain.  Supination:  normal Right elbow supination: pain.    Tests   Varus: negative  Valgus: negative  Tinel's sign (cubital tunnel): negative    Other   Erythema: absent  Scars: absent  Sensation: normal  Pulse: present    Comments:  Negative hook sign  No distal biceps deformity             I have personally reviewed pertinent films in PACS and my interpretation is xr right elbow no fracture, dislocation, well maintained joint spaces .    Scribe Attestation      I,:  Terrence Osuna am acting as a scribe while in the presence of the attending physician.:       I,:  Dandy Dhaliwal MD personally performed the services described in this documentation    as scribed in my presence.:            "

## 2024-09-26 NOTE — ASSESSMENT & PLAN NOTE
Findings consistent with right elbow lateral epicondylitis. X-ray and prognosis reviewed with patient. Treatment can take several months to a year for condition to resolve. She will continue with physical therapy, OT will make custom volar wrist splint to wear at night and during day if needed. Continue with tennis elbow strap as well. She can continue with ibuprofen as directed and apply topical voltaren gel or aspercreme. When lifting items keep forearms supinated. She was given work restrictions. See patient back in 4 weeks to re evaluate. All patient's questions were answered to her satisfaction.  This note is created using dictation transcription.  It may contain typographical errors, grammatical errors, improperly dictated words, background noise and other errors.

## 2024-09-26 NOTE — PROGRESS NOTES
Orthosis    Diagnosis:   1. Lateral epicondylitis of right elbow  Ambulatory Referral to PT/OT Hand Therapy        Indication: Motion Blocking    Location: Right  wrist  Supplies: Custom Fit Orthotic and Skin coverage   Orthosis type: Wrist splint  Wearing Schedule: Night only and As Needed  Describe Position: wrist ext     Precautions: Universal (skin contact/breakdown)    Patient or Caregiver expresses understanding of wearing Schedule and Precautions? Yes  Patient or Caregiver able to don/doff orthotic independently?Yes    Written orders provided to patient? Yes  Orders Obtained: Written  Orders Obtained from: Dr Dhaliwal    Return for evaluation and treatment No

## 2024-10-24 ENCOUNTER — OFFICE VISIT (OUTPATIENT)
Dept: OBGYN CLINIC | Facility: CLINIC | Age: 44
End: 2024-10-24

## 2024-10-24 VITALS
WEIGHT: 264 LBS | HEIGHT: 67 IN | BODY MASS INDEX: 41.44 KG/M2 | DIASTOLIC BLOOD PRESSURE: 80 MMHG | SYSTOLIC BLOOD PRESSURE: 122 MMHG

## 2024-10-24 DIAGNOSIS — M77.11 LATERAL EPICONDYLITIS OF RIGHT ELBOW: Primary | ICD-10-CM

## 2024-10-24 PROCEDURE — 99213 OFFICE O/P EST LOW 20 MIN: CPT | Performed by: ORTHOPAEDIC SURGERY

## 2024-10-24 NOTE — PROGRESS NOTES
Assessment:     1. Lateral epicondylitis of right elbow        Plan:     Problem List Items Addressed This Visit          Musculoskeletal and Integument    Lateral epicondylitis of right elbow - Primary     Findings consistent with right lateral epicondylitis. Patient has seen some improvement in her symptoms. This condition can take up to a year to resolve from onset. She will continue with her current work restrictions. She will have new referral for OT/physical therapy. Avoid any strenuous lifting, repetitive use of right arm. Continue with topical aspercreme, aleve for pain control. Would recommend using wrist brace during day with activity. All patient's questions were answered to her satisfaction.  This note is created using dictation transcription.  It may contain typographical errors, grammatical errors, improperly dictated words, background noise and other errors.         Relevant Orders    Ambulatory Referral to PT/OT Hand Therapy      Subjective:     Patient ID: Lillie Lopez is a 43 y.o. female.  Chief Complaint:  43 yr old female in for follow up regarding right elbow. Treating for lateral epicondylitis.  Work injury from February. She is  at [employer]. Her job requires repetitive lifting 20-25 lb parts. She states she was lifting part in February and felt a pop in elbow region followed with acute pain lateral aspect. She was getting therapy at work but they want to send her out for further rehab. She states pain has improved the past 2 weeks because she has avoid using arm but still notes pain if gripping, grasping larger items. She is using aspercreme, aleve as needed for pain. Volar wrist splint aggravates her wrist, she is not using tennis elbow splint.     Allergy:  No Known Allergies  Medications:  all current active meds have been reviewed  Past Medical History:  History reviewed. No pertinent past medical history.  Past Surgical History:  Past Surgical History:   Procedure Laterality  "Date    APPENDECTOMY       SECTION  2003    HERNIA REPAIR      TUBAL LIGATION       Family History:  History reviewed. No pertinent family history.  Social History:  Social History     Substance and Sexual Activity   Alcohol Use Yes    Comment: social     Social History     Substance and Sexual Activity   Drug Use No     Social History     Tobacco Use   Smoking Status Every Day   Smokeless Tobacco Never     Review of Systems   Constitutional:  Negative for chills and fever.   HENT:  Negative for ear pain and sore throat.    Eyes:  Negative for pain and visual disturbance.   Respiratory:  Negative for cough and shortness of breath.    Cardiovascular:  Negative for chest pain and palpitations.   Gastrointestinal:  Negative for abdominal pain and vomiting.   Genitourinary:  Negative for dysuria and hematuria.   Musculoskeletal:  Positive for arthralgias (right elbow). Negative for back pain and joint swelling.   Skin:  Negative for color change and rash.   Neurological:  Negative for seizures and syncope.   Psychiatric/Behavioral: Negative.     All other systems reviewed and are negative.        Objective:  BP Readings from Last 1 Encounters:   10/24/24 122/80      Wt Readings from Last 1 Encounters:   10/24/24 120 kg (264 lb)      BMI:   Estimated body mass index is 41.35 kg/m² as calculated from the following:    Height as of this encounter: 5' 7\" (1.702 m).    Weight as of this encounter: 120 kg (264 lb).  BSA:   Estimated body surface area is 2.28 meters squared as calculated from the following:    Height as of this encounter: 5' 7\" (1.702 m).    Weight as of this encounter: 120 kg (264 lb).   Physical Exam  Vitals and nursing note reviewed.   Constitutional:       Appearance: Normal appearance. She is well-developed.   HENT:      Head: Normocephalic and atraumatic.      Right Ear: External ear normal.      Left Ear: External ear normal.   Eyes:      Extraocular Movements: Extraocular movements " intact.      Conjunctiva/sclera: Conjunctivae normal.   Pulmonary:      Effort: Pulmonary effort is normal.   Musculoskeletal:         General: Tenderness (right elbow arthralgia) present.      Cervical back: Neck supple.   Skin:     General: Skin is warm and dry.   Neurological:      Mental Status: She is alert and oriented to person, place, and time.      Deep Tendon Reflexes: Reflexes are normal and symmetric.   Psychiatric:         Mood and Affect: Mood normal.         Behavior: Behavior normal.       Right Elbow Exam     Tenderness   The patient is experiencing tenderness in the lateral epicondyle.     Range of Motion   Extension:  normal   Flexion:  normal   Pronation:  normal   Supination:  normal     Tests   Varus: negative  Valgus: negative  Tinel's sign (cubital tunnel): negative    Other   Erythema: absent  Scars: absent  Sensation: normal  Pulse: present            No new imaging    Scribe Attestation      I,:  Terrence Osuna am acting as a scribe while in the presence of the attending physician.:       I,:  Dandy Dhaliwal MD personally performed the services described in this documentation    as scribed in my presence.:

## 2024-10-24 NOTE — ASSESSMENT & PLAN NOTE
Findings consistent with right lateral epicondylitis. Patient has seen some improvement in her symptoms. This condition can take up to a year to resolve from onset. She will continue with her current work restrictions. She will have new referral for OT/physical therapy. Avoid any strenuous lifting, repetitive use of right arm. Continue with topical aspercreme, aleve for pain control. Would recommend using wrist brace during day with activity. All patient's questions were answered to her satisfaction.  This note is created using dictation transcription.  It may contain typographical errors, grammatical errors, improperly dictated words, background noise and other errors.

## 2024-11-21 ENCOUNTER — OFFICE VISIT (OUTPATIENT)
Dept: OBGYN CLINIC | Facility: CLINIC | Age: 44
End: 2024-11-21
Payer: OTHER MISCELLANEOUS

## 2024-11-21 VITALS
HEIGHT: 67 IN | DIASTOLIC BLOOD PRESSURE: 68 MMHG | SYSTOLIC BLOOD PRESSURE: 116 MMHG | WEIGHT: 273 LBS | BODY MASS INDEX: 42.85 KG/M2

## 2024-11-21 DIAGNOSIS — M77.11 LATERAL EPICONDYLITIS OF RIGHT ELBOW: Primary | ICD-10-CM

## 2024-11-21 PROCEDURE — 99213 OFFICE O/P EST LOW 20 MIN: CPT | Performed by: ORTHOPAEDIC SURGERY

## 2024-11-21 NOTE — PROGRESS NOTES
Assessment:     1. Lateral epicondylitis of right elbow        Plan:     Problem List Items Addressed This Visit          Musculoskeletal and Integument    Lateral epicondylitis of right elbow - Primary    Findings consistent with right lateral epicondylitis. Patient has seen some improvement in her symptoms. This condition can take up to a year to resolve from onset.work restrictions increased 5-10 lbs lifting, pushing, pulling.  She will have new referral for OT/physical therapy. Avoid any strenuous lifting, repetitive use of right arm. Continue with topical aspercreme, aleve for pain control. Would recommend using wrist brace during day with activity. See back in 4 weeks. All patient's questions were answered to her satisfaction. This note is created using dictation transcription. It may contain typographical errors, grammatical errors, improperly dictated words, background noise and other errors.          Relevant Orders    Ambulatory Referral to Physical Therapy      Subjective:     Patient ID: Lillie Lopez is a 43 y.o. female.  Chief Complaint:  43 yr old female in for follow up regarding right elbow. Treating for lateral epicondylitis.  Work injury from February. She is  at [employer]. Her job requires repetitive lifting 20-25 lb parts. She states she was lifting part in February and felt a pop in elbow region followed with acute pain lateral aspect. She was getting therapy at work but not offering any significant relief and now attending therapy at [facility] which she states is much better. She has seen improvement since last office appt. She is able to lift and carry light items. Still notes trouble opening jars.     Allergy:  No Known Allergies  Medications:  all current active meds have been reviewed  Past Medical History:  History reviewed. No pertinent past medical history.  Past Surgical History:  Past Surgical History:   Procedure Laterality Date    APPENDECTOMY       SECTION  ,  "2003    HERNIA REPAIR      TUBAL LIGATION       Family History:  History reviewed. No pertinent family history.  Social History:  Social History     Substance and Sexual Activity   Alcohol Use Yes    Alcohol/week: 7.0 standard drinks of alcohol    Types: 4 Shots of liquor, 3 Standard drinks or equivalent per week    Comment: social     Social History     Substance and Sexual Activity   Drug Use No     Social History     Tobacco Use   Smoking Status Every Day    Current packs/day: 1.00    Average packs/day: 1 pack/day for 25.0 years (25.0 ttl pk-yrs)    Types: Cigarettes   Smokeless Tobacco Never     Review of Systems   Constitutional:  Negative for chills and fever.   HENT:  Negative for ear pain and sore throat.    Eyes:  Negative for pain and visual disturbance.   Respiratory:  Negative for cough and shortness of breath.    Cardiovascular:  Negative for chest pain and palpitations.   Gastrointestinal:  Negative for abdominal pain and vomiting.   Genitourinary:  Negative for dysuria and hematuria.   Musculoskeletal:  Positive for arthralgias (right elbow). Negative for back pain and joint swelling.   Skin:  Negative for color change and rash.   Neurological:  Negative for seizures and syncope.   Psychiatric/Behavioral: Negative.     All other systems reviewed and are negative.        Objective:  BP Readings from Last 1 Encounters:   11/21/24 116/68      Wt Readings from Last 1 Encounters:   11/21/24 124 kg (273 lb)      BMI:   Estimated body mass index is 42.76 kg/m² as calculated from the following:    Height as of this encounter: 5' 7\" (1.702 m).    Weight as of this encounter: 124 kg (273 lb).  BSA:   Estimated body surface area is 2.31 meters squared as calculated from the following:    Height as of this encounter: 5' 7\" (1.702 m).    Weight as of this encounter: 124 kg (273 lb).   Physical Exam  Vitals and nursing note reviewed.   Constitutional:       Appearance: Normal appearance. She is well-developed. "   HENT:      Head: Normocephalic and atraumatic.      Right Ear: External ear normal.      Left Ear: External ear normal.   Eyes:      Extraocular Movements: Extraocular movements intact.      Conjunctiva/sclera: Conjunctivae normal.   Pulmonary:      Effort: Pulmonary effort is normal.   Musculoskeletal:         General: Tenderness (right elbow arthralgia) present.      Cervical back: Neck supple.   Skin:     General: Skin is warm and dry.   Neurological:      Mental Status: She is alert and oriented to person, place, and time.      Deep Tendon Reflexes: Reflexes are normal and symmetric.   Psychiatric:         Mood and Affect: Mood normal.         Behavior: Behavior normal.       Right Elbow Exam     Tenderness   The patient is experiencing tenderness in the lateral epicondyle.     Range of Motion   Extension:  normal   Flexion:  normal   Pronation:  normal   Supination:  normal     Tests   Varus: negative  Valgus: negative  Tinel's sign (cubital tunnel): negative    Other   Erythema: absent  Scars: absent  Sensation: normal  Pulse: present            No new imaging    Scribe Attestation      I,:  Terrence Osuna am acting as a scribe while in the presence of the attending physician.:       I,:  Dandy Dhaliwal MD personally performed the services described in this documentation    as scribed in my presence.:

## 2024-11-21 NOTE — ASSESSMENT & PLAN NOTE
Findings consistent with right lateral epicondylitis. Patient has seen some improvement in her symptoms. This condition can take up to a year to resolve from onset.work restrictions increased 5-10 lbs lifting, pushing, pulling.  She will have new referral for OT/physical therapy. Avoid any strenuous lifting, repetitive use of right arm. Continue with topical aspercreme, aleve for pain control. Would recommend using wrist brace during day with activity. See back in 4 weeks. All patient's questions were answered to her satisfaction. This note is created using dictation transcription. It may contain typographical errors, grammatical errors, improperly dictated words, background noise and other errors.

## 2024-11-25 ENCOUNTER — TELEPHONE (OUTPATIENT)
Age: 44
End: 2024-11-25

## 2024-11-25 NOTE — TELEPHONE ENCOUNTER
Caller: noble from PT    Doctor: Madhuri    Reason for call: requesting updated PT referral to be faxed to [phone]    Call back#: [phone]

## 2024-12-05 ENCOUNTER — OFFICE VISIT (OUTPATIENT)
Dept: OBGYN CLINIC | Facility: CLINIC | Age: 44
End: 2024-12-05
Payer: OTHER MISCELLANEOUS

## 2024-12-05 VITALS
HEIGHT: 67 IN | SYSTOLIC BLOOD PRESSURE: 120 MMHG | DIASTOLIC BLOOD PRESSURE: 80 MMHG | WEIGHT: 272 LBS | BODY MASS INDEX: 42.69 KG/M2

## 2024-12-05 DIAGNOSIS — M77.11 LATERAL EPICONDYLITIS OF RIGHT ELBOW: Primary | ICD-10-CM

## 2024-12-05 PROCEDURE — 99213 OFFICE O/P EST LOW 20 MIN: CPT | Performed by: ORTHOPAEDIC SURGERY

## 2024-12-05 NOTE — PROGRESS NOTES
Assessment:     1. Lateral epicondylitis of right elbow          Plan:     Problem List Items Addressed This Visit          Musculoskeletal and Integument    Lateral epicondylitis of right elbow - Primary    Findings consistent with resolved right lateral epicondylitis. Maintain HEP as shown by therapy. Gradually increase her activities to tolerance. Work note supplied to return to normal work duties as of 24. See back as needed.  All patient's questions were answered to her satisfaction. This note is created using dictation transcription. It may contain typographical errors, grammatical errors, improperly dictated words, background noise and other errors.            Subjective:     Patient ID: Lillie Lopez is a 43 y.o. female.  Chief Complaint:  43 yr old female in for follow up regarding right elbow. Treating for lateral epicondylitis.  Work injury from February. She is  at [employer]. Her job requires repetitive lifting 20-25 lb parts. She has been attending physical therapy with [facility]. She states her pain has completely resolved. She wants to return to normal work duties.     Allergy:  No Known Allergies  Medications:  all current active meds have been reviewed  Past Medical History:  History reviewed. No pertinent past medical history.  Past Surgical History:  Past Surgical History:   Procedure Laterality Date    APPENDECTOMY       SECTION  ,     HERNIA REPAIR      TUBAL LIGATION       Family History:  History reviewed. No pertinent family history.  Social History:  Social History     Substance and Sexual Activity   Alcohol Use Yes    Alcohol/week: 7.0 standard drinks of alcohol    Types: 4 Shots of liquor, 3 Standard drinks or equivalent per week    Comment: social     Social History     Substance and Sexual Activity   Drug Use No     Social History     Tobacco Use   Smoking Status Every Day    Current packs/day: 1.00    Average packs/day: 1 pack/day for 25.0 years (25.0 ttl  "pk-yrs)    Types: Cigarettes   Smokeless Tobacco Never     Review of Systems   Constitutional:  Negative for chills and fever.   HENT:  Negative for ear pain and sore throat.    Eyes:  Negative for pain and visual disturbance.   Respiratory:  Negative for cough and shortness of breath.    Cardiovascular:  Negative for chest pain and palpitations.   Gastrointestinal:  Negative for abdominal pain and vomiting.   Genitourinary:  Negative for dysuria and hematuria.   Musculoskeletal:  Positive for arthralgias (right elbow). Negative for back pain and joint swelling.   Skin:  Negative for color change and rash.   Neurological:  Negative for seizures and syncope.   Psychiatric/Behavioral: Negative.     All other systems reviewed and are negative.        Objective:  BP Readings from Last 1 Encounters:   12/05/24 120/80      Wt Readings from Last 1 Encounters:   12/05/24 123 kg (272 lb)      BMI:   Estimated body mass index is 42.6 kg/m² as calculated from the following:    Height as of this encounter: 5' 7\" (1.702 m).    Weight as of this encounter: 123 kg (272 lb).  BSA:   Estimated body surface area is 2.3 meters squared as calculated from the following:    Height as of this encounter: 5' 7\" (1.702 m).    Weight as of this encounter: 123 kg (272 lb).   Physical Exam  Vitals and nursing note reviewed.   Constitutional:       Appearance: Normal appearance. She is well-developed.   HENT:      Head: Normocephalic and atraumatic.      Right Ear: External ear normal.      Left Ear: External ear normal.   Eyes:      Extraocular Movements: Extraocular movements intact.      Conjunctiva/sclera: Conjunctivae normal.   Pulmonary:      Effort: Pulmonary effort is normal.   Musculoskeletal:         General: Tenderness (right elbow arthralgia) present.      Cervical back: Neck supple.   Skin:     General: Skin is warm and dry.   Neurological:      Mental Status: She is alert and oriented to person, place, and time.      Deep Tendon " Reflexes: Reflexes are normal and symmetric.   Psychiatric:         Mood and Affect: Mood normal.         Behavior: Behavior normal.       Right Elbow Exam     Tenderness   The patient is experiencing no tenderness.     Range of Motion   Extension:  normal   Flexion:  normal   Pronation:  normal   Supination:  normal     Tests   Varus: negative  Valgus: negative  Tinel's sign (cubital tunnel): negative    Other   Erythema: absent  Scars: absent  Sensation: normal  Pulse: present            No new imaging    Scribe Attestation      I,:  Terrence Osuna am acting as a scribe while in the presence of the attending physician.:       I,:  Dandy Dhaliwal MD personally performed the services described in this documentation    as scribed in my presence.:

## 2024-12-05 NOTE — ASSESSMENT & PLAN NOTE
Findings consistent with resolved right lateral epicondylitis. Maintain HEP as shown by therapy. Gradually increase her activities to tolerance. Work note supplied to return to normal work duties as of 12/6/24. See back as needed.  All patient's questions were answered to her satisfaction. This note is created using dictation transcription. It may contain typographical errors, grammatical errors, improperly dictated words, background noise and other errors.

## 2025-01-07 ENCOUNTER — VBI (OUTPATIENT)
Dept: ADMINISTRATIVE | Facility: OTHER | Age: 45
End: 2025-01-07

## 2025-01-07 NOTE — TELEPHONE ENCOUNTER
01/07/25 6:40 AM     Chart reviewed for   Cervical Cancer Screening    ; nothing is submitted to the patient's insurance at this time.     CM GARCIA MA   [facility]

## 2025-01-10 ENCOUNTER — OFFICE VISIT (OUTPATIENT)
Dept: FAMILY MEDICINE CLINIC | Facility: CLINIC | Age: 45
End: 2025-01-10
Payer: COMMERCIAL

## 2025-01-10 VITALS
SYSTOLIC BLOOD PRESSURE: 120 MMHG | HEIGHT: 67 IN | WEIGHT: 267 LBS | RESPIRATION RATE: 16 BRPM | OXYGEN SATURATION: 98 % | TEMPERATURE: 98 F | HEART RATE: 68 BPM | BODY MASS INDEX: 41.91 KG/M2 | DIASTOLIC BLOOD PRESSURE: 82 MMHG

## 2025-01-10 DIAGNOSIS — R05.1 ACUTE COUGH: Primary | ICD-10-CM

## 2025-01-10 DIAGNOSIS — U07.1 COVID-19: ICD-10-CM

## 2025-01-10 LAB
SARS-COV-2 AG UPPER RESP QL IA: POSITIVE
VALID CONTROL: ABNORMAL

## 2025-01-10 PROCEDURE — 99213 OFFICE O/P EST LOW 20 MIN: CPT | Performed by: PHYSICIAN ASSISTANT

## 2025-01-10 PROCEDURE — 87811 SARS-COV-2 COVID19 W/OPTIC: CPT | Performed by: PHYSICIAN ASSISTANT

## 2025-01-10 NOTE — LETTER
January 10, 2025     Patient: Lillie Lopez  YOB: 1980  Date of Visit: 1/10/2025      To Whom it May Concern:    Lillie Lopez is under my professional care. Lillie was seen in my office on 1/10/2025. Lillie may return to work on 1/13/2025 .    If you have any questions or concerns, please don't hesitate to call.         Sincerely,          Arabella Hastings PA-C        CC: No Recipients

## 2025-01-10 NOTE — PROGRESS NOTES
Assessment/Plan:            Subjective:   Chief Complaint   Patient presents with    Earache     Left earache, headache, body aches sinus pressure, mild cough  Started on Wednesday      Patient ID: Lillie Lopez is a 44 y.o. female.    Last night was itching ear and blood came out, then the next day noted sharp stabbing pains. Then today in to the sinus congestion. Fever 99.6. Chills. Took aleve yesterday with no relief.     Earache   There is pain in the left ear. This is a new problem. The current episode started yesterday. The problem occurs every few minutes. The problem has been unchanged. There has been no fever. The fever has been present for Less than 1 day. The pain is at a severity of 5/10. Associated symptoms include coughing, headaches and rhinorrhea.       {Common ambulatory SmartLinks:66929}    History reviewed. No pertinent past medical history.  Past Surgical History:   Procedure Laterality Date    APPENDECTOMY       SECTION  ,     HERNIA REPAIR      TUBAL LIGATION       History reviewed. No pertinent family history.  Social History     Socioeconomic History    Marital status: Single     Spouse name: Not on file    Number of children: Not on file    Years of education: Not on file    Highest education level: Not on file   Occupational History    Not on file   Tobacco Use    Smoking status: Every Day     Current packs/day: 1.00     Average packs/day: 1 pack/day for 25.0 years (25.0 ttl pk-yrs)     Types: Cigarettes    Smokeless tobacco: Never   Substance and Sexual Activity    Alcohol use: Yes     Alcohol/week: 7.0 standard drinks of alcohol     Types: 4 Shots of liquor, 3 Standard drinks or equivalent per week     Comment: social    Drug use: No    Sexual activity: Yes     Birth control/protection: None   Other Topics Concern    Not on file   Social History Narrative    Not on file     Social Drivers of Health     Financial Resource Strain: Not on file   Food Insecurity: Not on file  "  Transportation Needs: Not on file   Physical Activity: Not on file   Stress: Not on file   Social Connections: Unknown (6/18/2024)    Received from [facility]     How often do you feel lonely or isolated from those around you? (Adult - for ages 18 years and over): Not on file   Intimate Partner Violence: Not on file   Housing Stability: Not on file       Current Outpatient Medications:     albuterol (ProAir HFA) 90 mcg/act inhaler, Inhale 2 puffs every 6 (six) hours as needed for wheezing, Disp: 8.5 g, Rfl: 0    HYDROcodone-acetaminophen (NORCO) 5-325 mg per tablet, Take 1 tablet by mouth every 6 (six) hours as needed for pain for up to 12 dosesMax Daily Amount: 4 tablets (Patient not taking: Reported on 1/22/2020), Disp: 12 tablet, Rfl: 0    methylPREDNISolone 4 MG tablet therapy pack, Use as directed on package, Disp: 1 each, Rfl: 0    predniSONE 20 mg tablet, 3 tabs/day x 3 days, then BID x 3 days, then daily x 3 days (Patient not taking: Reported on 11/4/2020), Disp: 18 tablet, Rfl: 0    Review of Systems   HENT:  Positive for ear pain and rhinorrhea.    Respiratory:  Positive for cough.    Neurological:  Positive for headaches.             Objective:    Vitals:    01/10/25 1254   BP: 120/82   Pulse: 68   Resp: 16   Temp: 98 °F (36.7 °C)   TempSrc: Temporal   SpO2: 98%   Weight: 121 kg (267 lb)   Height: 5' 7\" (1.702 m)        Physical Exam          "

## 2025-01-11 NOTE — PROGRESS NOTES
COVID-19 Outpatient Progress Note  Name: Lillie Lopez      : 1980      MRN: [id number]  Encounter Provider: Arabella Hastings PA-C  Encounter Date: 1/10/2025   Encounter department: [facility]    Assessment & Plan  COVID-19    - reassurance viral, fluids, rest, OTC symptomatic relief as needed, should quarantine until fever free for 24 hours and improvement    F/u as needed         Disposition:     Discussed symptom directed medication options with patient. Discussed vitamin D, vitamin C, and/or zinc supplementation with patient.     I have spent a total time of 15 minutes on the day of the encounter for this patient including          Encounter provider: Arabella Hastings PA-C     Provider located at: [address] [place]  [address] [id number] [phone]     Recent Visits  Date Type Provider Dept   01/10/25 Office Visit Arabella Hastings PA-C [facility]   Showing recent visits within past 7 days and meeting all other requirements  Future Appointments  No visits were found meeting these conditions.  Showing future appointments within next 150 days and meeting all other requirements    History of Present Illness      Subjective:   Lillie Lopez is a 44 y.o. female who has been screened for COVID-19. Symptom change since last report: unchanged. Patient's symptoms include fatigue, malaise, nasal congestion, rhinorrhea, cough and headache. Patient denies fever, chills, sore throat, shortness of breath, chest tightness, abdominal pain, nausea, vomiting and diarrhea.     - Date of symptom onset: 2025  - Date of positive COVID-19 test: 1/10/2025. Type of test: Rapid antigen.     COVID-19 vaccination status: Fully vaccinated (primary series)    Lillie has been staying home and has isolated themselves in her home. She is taking care to not share personal items and is  "cleaning all surfaces that are touched often, like counters, tabletops, and doorknobs using household cleaning sprays or wipes. She is wearing a mask when she leaves her room.     Lab Results   Component Value Date    SARSCOVAG Positive (A) 01/10/2025       Review of Systems   Constitutional:  Positive for fatigue. Negative for chills and fever.   HENT:  Positive for congestion, ear pain and rhinorrhea. Negative for sore throat.    Respiratory:  Positive for cough. Negative for chest tightness and shortness of breath.    Gastrointestinal:  Negative for abdominal pain, diarrhea, nausea and vomiting.   Neurological:  Positive for headaches.     Objective   /82   Pulse 68   Temp 98 °F (36.7 °C) (Temporal)   Resp 16   Ht 5' 7\" (1.702 m)   Wt 121 kg (267 lb)   SpO2 98%   BMI 41.82 kg/m²     Physical Exam  Constitutional:       General: She is not in acute distress.     Appearance: Normal appearance. She is well-developed. She is not toxic-appearing.   HENT:      Head: Normocephalic and atraumatic.      Right Ear: Tympanic membrane, ear canal and external ear normal.      Left Ear: Tympanic membrane, ear canal and external ear normal.      Nose: Mucosal edema, congestion and rhinorrhea present.      Mouth/Throat:      Mouth: Mucous membranes are moist.      Pharynx: Oropharynx is clear. No oropharyngeal exudate or posterior oropharyngeal erythema.   Eyes:      Extraocular Movements: Extraocular movements intact.      Conjunctiva/sclera: Conjunctivae normal.   Cardiovascular:      Rate and Rhythm: Normal rate and regular rhythm.      Pulses: Normal pulses.      Heart sounds: Normal heart sounds.   Pulmonary:      Effort: Pulmonary effort is normal.      Breath sounds: Normal breath sounds.   Musculoskeletal:         General: Normal range of motion.      Cervical back: Normal range of motion and neck supple. No rigidity or tenderness.   Lymphadenopathy:      Cervical: No cervical adenopathy.   Skin:     " General: Skin is warm.   Neurological:      General: No focal deficit present.      Mental Status: She is alert and oriented to person, place, and time.   Psychiatric:         Mood and Affect: Mood normal.         Behavior: Behavior normal.         Thought Content: Thought content normal.         Judgment: Judgment normal.     Answers submitted by the patient for this visit:  Ear Pain Questionnaire (Submitted on 1/10/2025)  Chief Complaint: Ear pain  Affected ear: left  Chronicity: new  Onset: yesterday  Progression since onset: unchanged  Frequency: every few minutes  Fever: no fever  Fever duration: less than 1 day  Pain - numeric: 5/10

## 2025-03-03 ENCOUNTER — OFFICE VISIT (OUTPATIENT)
Dept: URGENT CARE | Facility: CLINIC | Age: 45
End: 2025-03-03
Payer: COMMERCIAL

## 2025-03-03 VITALS
HEART RATE: 72 BPM | TEMPERATURE: 97.5 F | SYSTOLIC BLOOD PRESSURE: 136 MMHG | WEIGHT: 267 LBS | BODY MASS INDEX: 41.91 KG/M2 | DIASTOLIC BLOOD PRESSURE: 88 MMHG | HEIGHT: 67 IN | RESPIRATION RATE: 16 BRPM | OXYGEN SATURATION: 99 %

## 2025-03-03 DIAGNOSIS — R09.89 CHEST CONGESTION: ICD-10-CM

## 2025-03-03 DIAGNOSIS — G43.909 MIGRAINE WITHOUT STATUS MIGRAINOSUS, NOT INTRACTABLE, UNSPECIFIED MIGRAINE TYPE: Primary | ICD-10-CM

## 2025-03-03 PROCEDURE — G0382 LEV 3 HOSP TYPE B ED VISIT: HCPCS | Performed by: NURSE PRACTITIONER

## 2025-03-03 PROCEDURE — S9083 URGENT CARE CENTER GLOBAL: HCPCS | Performed by: NURSE PRACTITIONER

## 2025-03-03 RX ORDER — BROMPHENIRAMINE MALEATE, PSEUDOEPHEDRINE HYDROCHLORIDE, AND DEXTROMETHORPHAN HYDROBROMIDE 2; 30; 10 MG/5ML; MG/5ML; MG/5ML
10 SYRUP ORAL 3 TIMES DAILY PRN
Qty: 150 ML | Refills: 0 | Status: SHIPPED | OUTPATIENT
Start: 2025-03-03

## 2025-03-03 RX ORDER — SUMATRIPTAN SUCCINATE 25 MG/1
25 TABLET ORAL 3 TIMES DAILY PRN
Qty: 12 TABLET | Refills: 1 | Status: SHIPPED | OUTPATIENT
Start: 2025-03-03

## 2025-03-03 NOTE — LETTER
March 3, 2025     Patient: Lillie Lopez   YOB: 1980   Date of Visit: 3/3/2025       To Whom it May Concern:    Lillie Lopez was seen in my clinic on 3/3/2025. She may return to work on 03/05/2025 .    If you have any questions or concerns, please don't hesitate to call.         Sincerely,          BONY Dupree        CC: No Recipients

## 2025-03-03 NOTE — PROGRESS NOTES
[facility]        NAME: Lillie Lopez is a 44 y.o. female  : 1980    MRN: [id number]  DATE: March 3, 2025  TIME: 2:38 PM    Assessment and Plan   Migraine without status migrainosus, not intractable, unspecified migraine type [G43.909]  1. Migraine without status migrainosus, not intractable, unspecified migraine type  SUMAtriptan (Imitrex) 25 mg tablet      2. Chest congestion  brompheniramine-pseudoephedrine-DM 30-2-10 MG/5ML syrup            Patient Instructions     Take medications as prescribed  Follow up with PCP in 3-5 days.  Proceed to  ER if symptoms worsen.    If tests have been performed at [facility], our office will contact you with results if changes need to be made to the care plan discussed with you at the visit.  You can review your full results on [facility] MyChart.    Chief Complaint     Chief Complaint   Patient presents with    Migraine     Started Saturday, it's mainly on the left side of her head, denies any other symptoms, and she's been taking Advil for relief         History of Present Illness       HPI  Presents to clinic with complaint of migraine headaches for the past 2 days.  Chronic history of migraines. Intermittent. Has not had any significant migraine like this in the last 8 to 9 years. In the past she used Imitrex with severe headaches. In the last few years she has been able to use Advil with good success but this time it is not helping. Also reports sensitivity to light and noise.  No nausea or vomiting. No lightheadedness    Review of Systems   Review of Systems   Constitutional:  Negative for fever.   HENT:  Positive for ear pain (left). Negative for congestion, rhinorrhea and sore throat.    Eyes:  Positive for photophobia (and sound makes it worse).   Respiratory:  Negative for cough and wheezing.    Cardiovascular:  Negative for chest pain.   Gastrointestinal:  Negative for nausea and vomiting.   Neurological:  Positive for headaches (achy sharp  "throbbing).         Current Medications       Current Outpatient Medications:     brompheniramine-pseudoephedrine-DM 30-2-10 MG/5ML syrup, Take 10 mL by mouth 3 (three) times a day as needed for cough or congestion, Disp: 150 mL, Rfl: 0    SUMAtriptan (Imitrex) 25 mg tablet, Take 1 tablet (25 mg total) by mouth 3 (three) times a day as needed for migraine for up to 24 doses, Disp: 12 tablet, Rfl: 1    albuterol (ProAir HFA) 90 mcg/act inhaler, Inhale 2 puffs every 6 (six) hours as needed for wheezing, Disp: 8.5 g, Rfl: 0    Current Allergies     Allergies as of 2025    (No Known Allergies)            The following portions of the patient's history were reviewed and updated as appropriate: allergies, current medications, past family history, past medical history, past social history, past surgical history and problem list.     No past medical history on file.    Past Surgical History:   Procedure Laterality Date    APPENDECTOMY       SECTION  ,     HERNIA REPAIR      TUBAL LIGATION         No family history on file.      Medications have been verified.        Objective   /88 (BP Location: Left arm, Patient Position: Sitting, Cuff Size: Large)   Pulse 72   Temp 97.5 °F (36.4 °C) (Tympanic)   Resp 16   Ht 5' 7\" (1.702 m)   Wt 121 kg (267 lb)   LMP 2025 (Exact Date)   SpO2 99%   BMI 41.82 kg/m²   Patient's last menstrual period was 2025 (exact date).       Physical Exam     Physical Exam  Constitutional:       General: She is not in acute distress.  HENT:      Right Ear: Tympanic membrane normal.      Left Ear: Tympanic membrane normal.      Nose: No rhinorrhea.      Mouth/Throat:      Pharynx: No posterior oropharyngeal erythema.   Cardiovascular:      Rate and Rhythm: Regular rhythm.      Heart sounds: Normal heart sounds.   Pulmonary:      Effort: Pulmonary effort is normal.      Breath sounds: No wheezing.      Comments: Some congestion in the lungs  Lymphadenopathy: "      Cervical: No cervical adenopathy.